# Patient Record
Sex: FEMALE | Race: AMERICAN INDIAN OR ALASKA NATIVE | Employment: FULL TIME | URBAN - NONMETROPOLITAN AREA
[De-identification: names, ages, dates, MRNs, and addresses within clinical notes are randomized per-mention and may not be internally consistent; named-entity substitution may affect disease eponyms.]

---

## 2021-02-25 ENCOUNTER — OFFICE VISIT (OUTPATIENT)
Dept: FAMILY MEDICINE | Facility: OTHER | Age: 21
End: 2021-02-25
Attending: PHYSICIAN ASSISTANT
Payer: COMMERCIAL

## 2021-02-25 VITALS
RESPIRATION RATE: 20 BRPM | TEMPERATURE: 97.7 F | WEIGHT: 154 LBS | BODY MASS INDEX: 24.17 KG/M2 | OXYGEN SATURATION: 100 % | HEIGHT: 67 IN | DIASTOLIC BLOOD PRESSURE: 60 MMHG | SYSTOLIC BLOOD PRESSURE: 122 MMHG

## 2021-02-25 DIAGNOSIS — F41.1 GAD (GENERALIZED ANXIETY DISORDER): Primary | ICD-10-CM

## 2021-02-25 DIAGNOSIS — F32.4 MAJOR DEPRESSIVE DISORDER WITH SINGLE EPISODE, IN PARTIAL REMISSION (H): ICD-10-CM

## 2021-02-25 PROCEDURE — G0463 HOSPITAL OUTPT CLINIC VISIT: HCPCS | Performed by: PHYSICIAN ASSISTANT

## 2021-02-25 PROCEDURE — 99205 OFFICE O/P NEW HI 60 MIN: CPT | Performed by: PHYSICIAN ASSISTANT

## 2021-02-25 RX ORDER — FLUOXETINE 10 MG/1
10 CAPSULE ORAL DAILY
Qty: 60 CAPSULE | Refills: 1 | Status: SHIPPED | OUTPATIENT
Start: 2021-02-25

## 2021-02-25 RX ORDER — HYDROXYZINE HYDROCHLORIDE 25 MG/1
25 TABLET, FILM COATED ORAL EVERY 4 HOURS PRN
Qty: 30 TABLET | Refills: 1 | Status: SHIPPED | OUTPATIENT
Start: 2021-02-25

## 2021-02-25 ASSESSMENT — PATIENT HEALTH QUESTIONNAIRE - PHQ9
5. POOR APPETITE OR OVEREATING: NEARLY EVERY DAY
SUM OF ALL RESPONSES TO PHQ QUESTIONS 1-9: 17

## 2021-02-25 ASSESSMENT — ANXIETY QUESTIONNAIRES
1. FEELING NERVOUS, ANXIOUS, OR ON EDGE: NEARLY EVERY DAY
IF YOU CHECKED OFF ANY PROBLEMS ON THIS QUESTIONNAIRE, HOW DIFFICULT HAVE THESE PROBLEMS MADE IT FOR YOU TO DO YOUR WORK, TAKE CARE OF THINGS AT HOME, OR GET ALONG WITH OTHER PEOPLE: EXTREMELY DIFFICULT
5. BEING SO RESTLESS THAT IT IS HARD TO SIT STILL: NEARLY EVERY DAY
3. WORRYING TOO MUCH ABOUT DIFFERENT THINGS: NEARLY EVERY DAY
2. NOT BEING ABLE TO STOP OR CONTROL WORRYING: NEARLY EVERY DAY
6. BECOMING EASILY ANNOYED OR IRRITABLE: NEARLY EVERY DAY
GAD7 TOTAL SCORE: 20
7. FEELING AFRAID AS IF SOMETHING AWFUL MIGHT HAPPEN: MORE THAN HALF THE DAYS

## 2021-02-25 ASSESSMENT — MIFFLIN-ST. JEOR: SCORE: 1496.17

## 2021-02-25 ASSESSMENT — PAIN SCALES - GENERAL: PAINLEVEL: NO PAIN (0)

## 2021-02-25 NOTE — PROGRESS NOTES
"SUBJECTIVE:   HPI  Shahla Cervantes is a 20 year old female here for anxiety and depression.  She was unable to get into her primary care provider in [place] and felt her anxiety was severe enough so she drove to [place] today.  He states she also has a sister who I have treated in the past for anxiety and was told to come see me.    After starting college about 3 years ago patient states her anxiety rapidly increased and she now considers it severe and disrupting her ability to concentrate/performs tasks such as completing papers for college or interacting on a social level with her boyfriend and his friends.  She states she has never gone to her provider before because she was either too busy or reluctant to discuss her issues.  She denies any suicidal or homicidal ideation.  She rates her anxiety is 8-10/10.  She believes her anxiety has caused her to feel depressed with low energy and mood causing her not to get out of bed.  She has no past trauma or inciting event that she believes is the cause of her anxiety.  She has reached out to her mother about these issues however mother downplayed her complaints and was strictly against starting medications.  She has considered that she may be suffering from ADHD however has not had this formally evaluated.    GAD7  No flowsheet data found.    PHQ9  No flowsheet data found.    Allergies:  No Known Allergies    Review of Systems   As above otherwise ROS is unremarkable.     OBJECTIVE:     Vitals:    02/25/21 1137   BP: 122/60   BP Location: Right arm   Patient Position: Sitting   Cuff Size: Adult Regular   Resp: 20   Temp: 97.7  F (36.5  C)   TempSrc: Temporal   SpO2: 100%   Weight: 69.9 kg (154 lb)   Height: 1.702 m (5' 7\")       Physical Exam  General Appearance: Pleasant, alert, appropriate appearance for age and circumstances, no acute distress  Psychiatric Exam: Alert and oriented, appropriate affect    ASSESSMENT/PLAN:     1. ANDREWS (generalized anxiety disorder)  "   2. Major depressive disorder with single episode, in partial remission (H)      1. Start Prozac 10 mg.  Take 1 pill daily for 2 weeks and increase to 2 pills daily for 2 weeks.  Follow-up in 4 weeks or sooner if symptoms persist or worsen.  2. Start hydroxyzine 25 mg as needed for severe anxiety.  Follow-up if symptoms persist or worsen.  3. I extensively educated patient about the risk of increased suicidality after starting antidepressants and she verbalized understanding.  She has no history of suicidality.  4. I pulled a generalized ADHD questionnaire online and after completing the questionnaire the patient had a significant score suggestive of ADHD.  I am unsure if this is due to her anxiety and depression or if she has true ADHD. I did consider starting her on ADHD medications as an empirical trial to see if her symptoms improve.  However, as I will not be able to consistently follow up with this patient as she lives in [place] I am reluctant to pursue this course of therapy without close follow-up or a formal evaluation.  I discussed this with the patient and highly recommended she reach out to a psychiatrist in her area and follow-up with her primary care provider to discuss completing a formal ADHD evaluation as well as ongoing titration and/or changing of her Prozac and hydroxyzine as needed based on her symptoms.  Patient verbalized understanding of this and agreed with our plan.    Return in about 4 weeks (around 3/25/2021) for Follow up, with me.    Total time spent with this patient was 60 minutes which included chart review, visualization and interpretation of images, time spent with the patient, and documentation.    YONAS Boucher  [facility] AND \[facility] [place] [facility]\""    This document was prepared using voice generated software.  While every attempt was made for accuracy, grammatical errors may exist.

## 2021-02-26 ASSESSMENT — ANXIETY QUESTIONNAIRES: GAD7 TOTAL SCORE: 20

## 2021-04-11 ENCOUNTER — HEALTH MAINTENANCE LETTER (OUTPATIENT)
Age: 21
End: 2021-04-11

## 2021-09-25 ENCOUNTER — HEALTH MAINTENANCE LETTER (OUTPATIENT)
Age: 21
End: 2021-09-25

## 2022-05-07 ENCOUNTER — HEALTH MAINTENANCE LETTER (OUTPATIENT)
Age: 22
End: 2022-05-07

## 2023-04-22 ENCOUNTER — HEALTH MAINTENANCE LETTER (OUTPATIENT)
Age: 23
End: 2023-04-22

## 2023-06-02 ENCOUNTER — HEALTH MAINTENANCE LETTER (OUTPATIENT)
Age: 23
End: 2023-06-02